# Patient Record
Sex: FEMALE | Race: WHITE | NOT HISPANIC OR LATINO | Employment: PART TIME | URBAN - METROPOLITAN AREA
[De-identification: names, ages, dates, MRNs, and addresses within clinical notes are randomized per-mention and may not be internally consistent; named-entity substitution may affect disease eponyms.]

---

## 2019-09-08 ENCOUNTER — OFFICE VISIT (OUTPATIENT)
Dept: URGENT CARE | Facility: CLINIC | Age: 49
End: 2019-09-08

## 2019-09-08 VITALS
OXYGEN SATURATION: 97 % | DIASTOLIC BLOOD PRESSURE: 80 MMHG | SYSTOLIC BLOOD PRESSURE: 110 MMHG | WEIGHT: 138.8 LBS | HEIGHT: 64 IN | HEART RATE: 66 BPM | TEMPERATURE: 98.9 F | BODY MASS INDEX: 23.7 KG/M2 | RESPIRATION RATE: 16 BRPM

## 2019-09-08 DIAGNOSIS — G44.209 ACUTE NON INTRACTABLE TENSION-TYPE HEADACHE: ICD-10-CM

## 2019-09-08 PROCEDURE — 99203 OFFICE O/P NEW LOW 30 MIN: CPT | Performed by: PHYSICIAN ASSISTANT

## 2019-09-08 RX ORDER — CYCLOBENZAPRINE HCL 5 MG
5-10 TABLET ORAL NIGHTLY PRN
Qty: 30 TAB | Refills: 0 | Status: SHIPPED | OUTPATIENT
Start: 2019-09-08

## 2019-09-08 RX ORDER — KETOROLAC TROMETHAMINE 30 MG/ML
30 INJECTION, SOLUTION INTRAMUSCULAR; INTRAVENOUS ONCE
Status: COMPLETED | OUTPATIENT
Start: 2019-09-08 | End: 2019-09-08

## 2019-09-08 RX ADMIN — KETOROLAC TROMETHAMINE 30 MG: 30 INJECTION, SOLUTION INTRAMUSCULAR; INTRAVENOUS at 15:18

## 2019-09-08 NOTE — PROGRESS NOTES
"Chief Complaint   Patient presents with   • Headache     x 1 wk, frontal headache and pain on back of head       HISTORY OF PRESENT ILLNESS: Patient is a 49 y.o. female who presents today for about 1 week of overall slightly improved headaches overall, waxing and waning presentation.    She notes that the headache seems mostly in the back of the head with pressure that radiates bilaterally around to the front.  She also notes some radiation down her neck and into her shoulders.  She denies vision changes, numbness, tingling or focal weakness.  No sinus congestion or pressure.    She notes there is some modest improvement with Ibuprofen.     She does admit to increased life stressors lately.       There are no active problems to display for this patient.      Allergies:Patient has no known allergies.    No current [facility]-ordered outpatient medications on file.     No current [facility]-ordered facility-administered medications on file.        No past medical history on file.    Social History     Tobacco Use   • Smoking status: Never Smoker   • Smokeless tobacco: Never Used   Substance Use Topics   • Alcohol use: Not on file   • Drug use: Not on file       No family status information on file.   No family history on file.    ROS:  Review of Systems   Constitutional: Negative for fever, chills, weight loss and malaise/fatigue.   HENT: Negative for ear pain, nosebleeds, congestion, sore throat and neck pain.    Eyes: Negative for blurred vision.   Respiratory: Negative for cough, sputum production, shortness of breath and wheezing.    Cardiovascular: Negative for chest pain, palpitations, orthopnea and leg swelling.   Gastrointestinal: Negative for heartburn, nausea, vomiting and abdominal pain.   Neuro: SEE HPI    Exam:  /80 (BP Location: Left arm, Patient Position: Sitting, BP Cuff Size: Adult)   Pulse 66   Temp 37.2 °C (98.9 °F) (Temporal)   Resp 16   Ht 1.626 m (5' 4\")   Wt 63 kg (138 lb 12.8 oz)   SpO2 97% "   General:  Well nourished, well developed female in NAD  Eyes: PERRLA, EOM within normal limits, no conjunctival injection, no scleral icterus, visual fields and acuity grossly intact.  Ears: Normal shape and symmetry, no tenderness, no discharge. External canals are without any significant edema or erythema. Tympanic membranes are without any inflammation, no effusion. Gross auditory acuity is intact  Nose: Symmetrical, sinuses without tenderness, no discharge.   Mouth: reasonable hygiene, no erythema exudates or tonsillar enlargement.  Neck: no masses, range of motion within normal limits, no tracheal deviation. No lymphadenopathy..  There is bilateral TTP of posterior and lateral neck musculature into the occipital region of skull and into bilateral trapezius musculature.  Spasms noted.   Pulmonary: Normal respiratory effort, no wheezes, crackles, or rhonchi.  Cardiovascular: regular rate and rhythm without murmurs, rubs, or gallops.  Abdomen: Soft, nontender, nondistended. Normal bowel sounds. No hepatosplenomegaly or masses, or hernias. No rebound or guarding.  Skin: No visible rashes or lesion. Warm, pink, dry.   Extremities: no clubbing, cyanosis, or edema.  Neuro: A&O x 3.  CN 2-12 grossly intact.  Motor strength upper and lower full and intact bilaterally.  Speech normal/clear.  Normal coordination. Normal gait.         Assessment/Plan:  1. Acute non intractable tension-type headache  ketorolac (TORADOL) injection 30 mg    cyclobenzaprine (FLEXERIL) 5 MG tablet       -Toradol shot given in clinic, patient tolerated well.  Monitored for 10 mins s/p shot.   -consistent with tension type headaches.  Discussed dx at length.   -Flexeril if needed for bedtime/spasms. No driving, caution drowsy.   -neuro/headache pain red flags and ER precautions discussed with patient.   -PCP follow up advised      Supportive care, differential diagnoses, and indications for immediate follow-up discussed with patient.    Pathogenesis of diagnosis discussed including typical length and natural progression.   Instructed to return to clinic or nearest emergency department for any change in condition, further concerns, or worsening of symptoms.  Patient states understanding of the plan of care and discharge instructions.      Sommer Cabrera P.A.-C.

## 2019-11-12 ENCOUNTER — HOSPITAL ENCOUNTER (OUTPATIENT)
Facility: MEDICAL CENTER | Age: 49
End: 2019-11-12
Attending: PHYSICIAN ASSISTANT

## 2019-11-12 ENCOUNTER — OFFICE VISIT (OUTPATIENT)
Dept: URGENT CARE | Facility: CLINIC | Age: 49
End: 2019-11-12

## 2019-11-12 VITALS
BODY MASS INDEX: 25.4 KG/M2 | HEART RATE: 107 BPM | SYSTOLIC BLOOD PRESSURE: 146 MMHG | RESPIRATION RATE: 16 BRPM | OXYGEN SATURATION: 97 % | DIASTOLIC BLOOD PRESSURE: 90 MMHG | TEMPERATURE: 98.6 F | WEIGHT: 148 LBS

## 2019-11-12 DIAGNOSIS — M62.830 LUMBAR PARASPINAL MUSCLE SPASM: ICD-10-CM

## 2019-11-12 DIAGNOSIS — M54.32 SCIATICA OF LEFT SIDE: ICD-10-CM

## 2019-11-12 DIAGNOSIS — R82.998 LEUKOCYTES IN URINE: ICD-10-CM

## 2019-11-12 LAB
APPEARANCE UR: CLEAR
BILIRUB UR STRIP-MCNC: NORMAL MG/DL
COLOR UR AUTO: NORMAL
GLUCOSE UR STRIP.AUTO-MCNC: NORMAL MG/DL
KETONES UR STRIP.AUTO-MCNC: NORMAL MG/DL
LEUKOCYTE ESTERASE UR QL STRIP.AUTO: NORMAL
NITRITE UR QL STRIP.AUTO: NORMAL
PH UR STRIP.AUTO: 6 [PH] (ref 5–8)
PROT UR QL STRIP: NORMAL MG/DL
RBC UR QL AUTO: NORMAL
SP GR UR STRIP.AUTO: 1.02
UROBILINOGEN UR STRIP-MCNC: 0.2 MG/DL

## 2019-11-12 PROCEDURE — 81002 URINALYSIS NONAUTO W/O SCOPE: CPT | Performed by: PHYSICIAN ASSISTANT

## 2019-11-12 PROCEDURE — 87086 URINE CULTURE/COLONY COUNT: CPT

## 2019-11-12 PROCEDURE — 99214 OFFICE O/P EST MOD 30 MIN: CPT | Performed by: PHYSICIAN ASSISTANT

## 2019-11-12 RX ORDER — CYCLOBENZAPRINE HCL 10 MG
10 TABLET ORAL 3 TIMES DAILY PRN
Qty: 30 TAB | Refills: 0 | Status: SHIPPED | OUTPATIENT
Start: 2019-11-12

## 2019-11-12 RX ORDER — PREDNISONE 20 MG/1
TABLET ORAL
Qty: 18 TAB | Refills: 0 | Status: SHIPPED | OUTPATIENT
Start: 2019-11-12

## 2019-11-12 RX ORDER — KETOROLAC TROMETHAMINE 30 MG/ML
60 INJECTION, SOLUTION INTRAMUSCULAR; INTRAVENOUS ONCE
Status: COMPLETED | OUTPATIENT
Start: 2019-11-12 | End: 2019-11-12

## 2019-11-12 RX ORDER — CEFDINIR 300 MG/1
300 CAPSULE ORAL 2 TIMES DAILY
Qty: 14 CAP | Refills: 0 | Status: SHIPPED | OUTPATIENT
Start: 2019-11-12 | End: 2019-11-19

## 2019-11-12 RX ADMIN — KETOROLAC TROMETHAMINE 60 MG: 30 INJECTION, SOLUTION INTRAMUSCULAR; INTRAVENOUS at 16:28

## 2019-11-12 ASSESSMENT — ENCOUNTER SYMPTOMS
BRUISES/BLEEDS EASILY: 0
VOMITING: 0
FLANK PAIN: 0
BACK PAIN: 1
FEVER: 0
CHILLS: 0
NAUSEA: 0
SHORTNESS OF BREATH: 0
SENSORY CHANGE: 0
WEAKNESS: 0
TINGLING: 0
ABDOMINAL PAIN: 0

## 2019-11-13 NOTE — PROGRESS NOTES
Subjective:     Miriam Oconnor  is a 49 y.o. female who presents for Low Back Pain (x1 month was seen at two different urgent cares in [place] )       Patient is a clinic 4 to 5 weeks with persistent left low back pain.  She notes some waxing waning over time.  She notes pain has nearly relieved with treatments but has continued to come back.  She denies specific trauma or injury.  5 to 6 weeks ago and the week prior to onset of symptoms she was moving between 2 households and was carrying multiple boxes.  She described this to the initial urgent care for which she was seen for back pain in [place] in early October she was 1-2 other times for the last 5 weeks.  She describes some intermittent improvement with corticosteroids, muscle relaxants and Toradol injection over the past month but denies lasting.  She has low back pain that radiates down into her leg at times.  She denies fevers chills or feeling sick.  She denies rash.  She denies dysuria hematuria urgency of urination or feeling sick.  She does note some urinary frequency.  She denies past medical history of back surgeries.  She denies saddle anesthesia or incontinence.  She denies numbness tingling or weakness.  She notes most relief from over-the-counter Advil.    Back Pain   This is a recurrent problem. The current episode started more than 1 month ago. Pertinent negatives include no abdominal pain, dysuria, fever, tingling or weakness.   History reviewed. No pertinent past medical history.History reviewed. No pertinent surgical history.  Social History     Socioeconomic History   • Marital status:      Spouse name: Not on file   • Number of children: Not on file   • Years of education: Not on file   • Highest education level: Not on file   Occupational History   • Not on file   Social Needs   • Financial resource strain: Not on file   • Food insecurity:     Worry: Not on file     Inability: Not on file   • Transportation needs:     Medical:  Not on file     Non-medical: Not on file   Tobacco Use   • Smoking status: Never Smoker   • Smokeless tobacco: Never Used   Substance and Sexual Activity   • Alcohol use: Not on file   • Drug use: Not on file   • Sexual activity: Not on file   Lifestyle   • Physical activity:     Days per week: Not on file     Minutes per session: Not on file   • Stress: Not on file   Relationships   • Social connections:     Talks on phone: Not on file     Gets together: Not on file     Attends Jewish service: Not on file     Active member of club or organization: Not on file     Attends meetings of clubs or organizations: Not on file     Relationship status: Not on file   • Intimate partner violence:     Fear of current or ex partner: Not on file     Emotionally abused: Not on file     Physically abused: Not on file     Forced sexual activity: Not on file   Other Topics Concern   • Not on file   Social History Narrative   • Not on file    History reviewed. No pertinent family history. Review of Systems   Constitutional: Negative for chills and fever.   Respiratory: Negative for shortness of breath.    Gastrointestinal: Negative for abdominal pain, nausea and vomiting.   Genitourinary: Positive for frequency. Negative for dysuria, flank pain, hematuria and urgency.   Musculoskeletal: Positive for back pain.   Skin: Negative for rash.   Neurological: Negative for tingling, sensory change and weakness.   Endo/Heme/Allergies: Does not bruise/bleed easily.   No Known Allergies   Objective:   /90   Pulse (!) 107   Temp 37 °C (98.6 °F) (Temporal)   Resp 16   Wt 67.1 kg (148 lb)   SpO2 97%   BMI 25.40 kg/m²   Physical Exam  Vitals signs and nursing note reviewed.   Constitutional:       General: She is not in acute distress.     Appearance: She is well-developed. She is not diaphoretic.   HENT:      Head: Normocephalic and atraumatic.      Right Ear: External ear normal.      Left Ear: External ear normal.      Nose: Nose  normal.   Eyes:      General: Lids are normal. No scleral icterus.        Right eye: No discharge.         Left eye: No discharge.      Conjunctiva/sclera: Conjunctivae normal.   Neck:      Musculoskeletal: Neck supple.   Pulmonary:      Effort: Pulmonary effort is normal. No respiratory distress.   Musculoskeletal:      Lumbar back: She exhibits decreased range of motion ( 2/2 pain), tenderness ( primary paraspinous), pain and spasm. She exhibits no bony tenderness, no swelling, no edema, no deformity, no laceration and normal pulse.        Back:    Skin:     General: Skin is warm and dry.      Coloration: Skin is not pale.      Findings: No erythema.   Neurological:      Mental Status: She is alert and oriented to person, place, and time. She is not disoriented.      Sensory: No sensory deficit.      Coordination: Coordination normal.      Deep Tendon Reflexes: Reflexes are normal and symmetric.      Comments: SLR normal bilat   Psychiatric:         Speech: Speech normal.         Behavior: Behavior normal.     Toradol 60-tolerates well  POCT UA -positive for moderate leukocyte esterase and hematuria    Urine cx pending - will call w/ results      Assessment/Plan:   Assessment    1. Lumbar paraspinal muscle spasm  - predniSONE (DELTASONE) 20 MG Tab; .Take 2 tabs daily for 4 days, the 1 tab daily for 4 days, then 1/2 tab daily for 4 days  Dispense: 18 Tab; Refill: 0  - ketorolac (TORADOL) injection 60 mg  - cyclobenzaprine (FLEXERIL) 10 MG Tab; Take 1 Tab by mouth 3 times a day as needed.  Dispense: 30 Tab; Refill: 0  - POCT Urinalysis  - REFERRAL TO [facility]TS [facility]    2. Sciatica of left side  - REFERRAL TO SPORTS MEDICINE    3. Leukocytes in urine  - cefdinir (OMNICEF) 300 MG Cap; Take 1 Cap by mouth 2 times a day for 7 days.  Dispense: 14 Cap; Refill: 0  - URINE CULTURE(NEW); Future  Recommend conservative care, rest, ice/heat, elevation, work on gentle ROM exercises, sent w/ exercises, Cautioned regarding  potential side effects of steroid, avoid nsaids while using  Cautioned regarding potential for sedation with medication.    Urinalysis checked prior to patient leaving clinic demonstrates moderate leukocyte esterase-patient is never had a urinary tract infection, notes some urinary frequency, has had recurrent symptoms of low back pain waxing waning for the last 1 month-we will treat low back pain today but potentially send patient with urinary antibiotic and check urine culture  Will call w/ cx results    Return to clinic with lack of resolution or progression of symptoms.    Differential diagnosis, natural history, supportive care, and indications for immediate follow-up discussed.

## 2019-11-15 ENCOUNTER — TELEPHONE (OUTPATIENT)
Dept: URGENT CARE | Facility: CLINIC | Age: 49
End: 2019-11-15

## 2019-11-15 LAB
BACTERIA UR CULT: ABNORMAL
BACTERIA UR CULT: ABNORMAL
SIGNIFICANT IND 70042: ABNORMAL
SITE SITE: ABNORMAL
SOURCE SOURCE: ABNORMAL

## 2019-11-16 NOTE — TELEPHONE ENCOUNTER
FYI:   Hi, patients Urine cx came back positive on 11/15/19, patient is currently being treated with abx  from 11/12/19.    I called and left message to return my call to Rianna at [facility] [phone].